# Patient Record
Sex: FEMALE | Race: BLACK OR AFRICAN AMERICAN | Employment: FULL TIME | URBAN - METROPOLITAN AREA
[De-identification: names, ages, dates, MRNs, and addresses within clinical notes are randomized per-mention and may not be internally consistent; named-entity substitution may affect disease eponyms.]

---

## 2017-09-02 ENCOUNTER — OCC HEALTH (OUTPATIENT)
Dept: OCCUPATIONAL MEDICINE | Age: 37
End: 2017-09-02
Attending: PHYSICIAN ASSISTANT

## 2021-09-07 ENCOUNTER — OFFICE VISIT (OUTPATIENT)
Dept: OBGYN CLINIC | Facility: CLINIC | Age: 41
End: 2021-09-07
Payer: COMMERCIAL

## 2021-09-07 VITALS
DIASTOLIC BLOOD PRESSURE: 76 MMHG | BODY MASS INDEX: 29.15 KG/M2 | HEART RATE: 103 BPM | SYSTOLIC BLOOD PRESSURE: 122 MMHG | WEIGHT: 196.81 LBS | HEIGHT: 69 IN

## 2021-09-07 DIAGNOSIS — Z12.4 CERVICAL CANCER SCREENING: ICD-10-CM

## 2021-09-07 DIAGNOSIS — Z13.29 SCREENING FOR ENDOCRINE, NUTRITIONAL, METABOLIC AND IMMUNITY DISORDER: ICD-10-CM

## 2021-09-07 DIAGNOSIS — Z86.2 HISTORY OF ANEMIA: ICD-10-CM

## 2021-09-07 DIAGNOSIS — Z13.21 SCREENING FOR ENDOCRINE, NUTRITIONAL, METABOLIC AND IMMUNITY DISORDER: ICD-10-CM

## 2021-09-07 DIAGNOSIS — Z12.31 ENCOUNTER FOR SCREENING MAMMOGRAM FOR BREAST CANCER: ICD-10-CM

## 2021-09-07 DIAGNOSIS — Z01.419 WELL WOMAN EXAM WITH ROUTINE GYNECOLOGICAL EXAM: Primary | ICD-10-CM

## 2021-09-07 DIAGNOSIS — Z13.228 SCREENING FOR ENDOCRINE, NUTRITIONAL, METABOLIC AND IMMUNITY DISORDER: ICD-10-CM

## 2021-09-07 DIAGNOSIS — R53.83 FATIGUE, UNSPECIFIED TYPE: ICD-10-CM

## 2021-09-07 DIAGNOSIS — R61 NIGHT SWEATS: ICD-10-CM

## 2021-09-07 DIAGNOSIS — Z13.0 SCREENING FOR ENDOCRINE, NUTRITIONAL, METABOLIC AND IMMUNITY DISORDER: ICD-10-CM

## 2021-09-07 PROCEDURE — 3074F SYST BP LT 130 MM HG: CPT | Performed by: NURSE PRACTITIONER

## 2021-09-07 PROCEDURE — 87624 HPV HI-RISK TYP POOLED RSLT: CPT | Performed by: NURSE PRACTITIONER

## 2021-09-07 PROCEDURE — 88175 CYTOPATH C/V AUTO FLUID REDO: CPT | Performed by: NURSE PRACTITIONER

## 2021-09-07 PROCEDURE — 99386 PREV VISIT NEW AGE 40-64: CPT | Performed by: NURSE PRACTITIONER

## 2021-09-07 PROCEDURE — 3008F BODY MASS INDEX DOCD: CPT | Performed by: NURSE PRACTITIONER

## 2021-09-07 PROCEDURE — 3078F DIAST BP <80 MM HG: CPT | Performed by: NURSE PRACTITIONER

## 2021-09-07 NOTE — PROGRESS NOTES
Here for new gynecology visit. 36year old G 1 P 1. Patient's last menstrual period was 08/25/2021 (exact date). .     Here for Annual Gynecologic Exam. She has a number of concerns to address today. She had a robotic removal of fibroids last year.  She ha swallowing. No hx thyroid dysfunction. Lungs:  No SOB, cough, wheezing, pneumonia in past.  Heart:  No chest pain, palpitations. Breasts:  No pain, lumps or secretions.   GI:   No nausea, emesis, reflux, liver disease, GB problems, issues with diarrhea or DIFFERENTIAL WITH PLATELET;  Future  - VITAMIN D, 25-HYDROXY; Future  - VITAMIN B12; Future    RTC 1 year/ prn

## 2021-09-13 LAB — HPV I/H RISK 1 DNA SPEC QL NAA+PROBE: NEGATIVE

## 2021-09-24 ENCOUNTER — LAB ENCOUNTER (OUTPATIENT)
Dept: LAB | Age: 41
End: 2021-09-24
Attending: NURSE PRACTITIONER
Payer: COMMERCIAL

## 2021-09-24 DIAGNOSIS — R61 NIGHT SWEATS: ICD-10-CM

## 2021-09-24 DIAGNOSIS — Z13.228 SCREENING FOR ENDOCRINE, NUTRITIONAL, METABOLIC AND IMMUNITY DISORDER: ICD-10-CM

## 2021-09-24 DIAGNOSIS — Z13.29 SCREENING FOR ENDOCRINE, NUTRITIONAL, METABOLIC AND IMMUNITY DISORDER: ICD-10-CM

## 2021-09-24 DIAGNOSIS — Z13.0 SCREENING FOR ENDOCRINE, NUTRITIONAL, METABOLIC AND IMMUNITY DISORDER: ICD-10-CM

## 2021-09-24 DIAGNOSIS — R53.83 FATIGUE, UNSPECIFIED TYPE: ICD-10-CM

## 2021-09-24 DIAGNOSIS — Z13.21 SCREENING FOR ENDOCRINE, NUTRITIONAL, METABOLIC AND IMMUNITY DISORDER: ICD-10-CM

## 2021-09-24 DIAGNOSIS — Z86.2 HISTORY OF ANEMIA: ICD-10-CM

## 2021-09-24 LAB
ALBUMIN SERPL-MCNC: 3.4 G/DL (ref 3.4–5)
ALBUMIN/GLOB SERPL: 1 {RATIO} (ref 1–2)
ALP LIVER SERPL-CCNC: 80 U/L
ALT SERPL-CCNC: 17 U/L
ANION GAP SERPL CALC-SCNC: 4 MMOL/L (ref 0–18)
AST SERPL-CCNC: 15 U/L (ref 15–37)
BASOPHILS # BLD AUTO: 0.01 X10(3) UL (ref 0–0.2)
BASOPHILS NFR BLD AUTO: 0.2 %
BILIRUB SERPL-MCNC: 0.4 MG/DL (ref 0.1–2)
BUN BLD-MCNC: 11 MG/DL (ref 7–18)
CALCIUM BLD-MCNC: 9 MG/DL (ref 8.5–10.1)
CHLORIDE SERPL-SCNC: 111 MMOL/L (ref 98–112)
CO2 SERPL-SCNC: 26 MMOL/L (ref 21–32)
CREAT BLD-MCNC: 1.04 MG/DL
EOSINOPHIL # BLD AUTO: 0.07 X10(3) UL (ref 0–0.7)
EOSINOPHIL NFR BLD AUTO: 1.5 %
ERYTHROCYTE [DISTWIDTH] IN BLOOD BY AUTOMATED COUNT: 13.2 %
ESTRADIOL SERPL-MCNC: 143.3 PG/ML
FSH SERPL-ACNC: 4.4 MIU/ML
GLOBULIN PLAS-MCNC: 3.5 G/DL (ref 2.8–4.4)
GLUCOSE BLD-MCNC: 107 MG/DL (ref 70–99)
HCT VFR BLD AUTO: 38.5 %
HGB BLD-MCNC: 11.8 G/DL
IMM GRANULOCYTES # BLD AUTO: 0.01 X10(3) UL (ref 0–1)
IMM GRANULOCYTES NFR BLD: 0.2 %
LH SERPL-ACNC: 2.8 MIU/ML
LYMPHOCYTES # BLD AUTO: 1.5 X10(3) UL (ref 1–4)
LYMPHOCYTES NFR BLD AUTO: 31.4 %
MCH RBC QN AUTO: 28.5 PG (ref 26–34)
MCHC RBC AUTO-ENTMCNC: 30.6 G/DL (ref 31–37)
MCV RBC AUTO: 93 FL
MONOCYTES # BLD AUTO: 0.38 X10(3) UL (ref 0.1–1)
MONOCYTES NFR BLD AUTO: 8 %
NEUTROPHILS # BLD AUTO: 2.8 X10 (3) UL (ref 1.5–7.7)
NEUTROPHILS # BLD AUTO: 2.8 X10(3) UL (ref 1.5–7.7)
NEUTROPHILS NFR BLD AUTO: 58.7 %
OSMOLALITY SERPL CALC.SUM OF ELEC: 292 MOSM/KG (ref 275–295)
PATIENT FASTING Y/N/NP: NO
PLATELET # BLD AUTO: 213 10(3)UL (ref 150–450)
POTASSIUM SERPL-SCNC: 4.4 MMOL/L (ref 3.5–5.1)
PROT SERPL-MCNC: 6.9 G/DL (ref 6.4–8.2)
RBC # BLD AUTO: 4.14 X10(6)UL
SODIUM SERPL-SCNC: 141 MMOL/L (ref 136–145)
T4 FREE SERPL-MCNC: 0.9 NG/DL (ref 0.8–1.7)
TSI SER-ACNC: 0.82 MIU/ML (ref 0.36–3.74)
VIT B12 SERPL-MCNC: 485 PG/ML (ref 193–986)
VIT D+METAB SERPL-MCNC: 28.6 NG/ML (ref 30–100)
WBC # BLD AUTO: 4.8 X10(3) UL (ref 4–11)

## 2021-09-24 PROCEDURE — 82306 VITAMIN D 25 HYDROXY: CPT

## 2021-09-24 PROCEDURE — 85025 COMPLETE CBC W/AUTO DIFF WBC: CPT

## 2021-09-24 PROCEDURE — 82670 ASSAY OF TOTAL ESTRADIOL: CPT

## 2021-09-24 PROCEDURE — 83001 ASSAY OF GONADOTROPIN (FSH): CPT

## 2021-09-24 PROCEDURE — 36415 COLL VENOUS BLD VENIPUNCTURE: CPT

## 2021-09-24 PROCEDURE — 82607 VITAMIN B-12: CPT

## 2021-09-24 PROCEDURE — 80053 COMPREHEN METABOLIC PANEL: CPT

## 2021-09-24 PROCEDURE — 84443 ASSAY THYROID STIM HORMONE: CPT

## 2021-09-24 PROCEDURE — 84439 ASSAY OF FREE THYROXINE: CPT

## 2021-09-24 PROCEDURE — 83002 ASSAY OF GONADOTROPIN (LH): CPT

## 2021-09-30 ENCOUNTER — ULTRASOUND ENCOUNTER (OUTPATIENT)
Dept: OBGYN CLINIC | Facility: CLINIC | Age: 41
End: 2021-09-30
Payer: COMMERCIAL

## 2021-09-30 DIAGNOSIS — D25.9 UTERINE LEIOMYOMA, UNSPECIFIED LOCATION: ICD-10-CM

## 2021-09-30 DIAGNOSIS — D50.8 OTHER IRON DEFICIENCY ANEMIA: Primary | ICD-10-CM

## 2021-09-30 PROCEDURE — 76830 TRANSVAGINAL US NON-OB: CPT | Performed by: OBSTETRICS & GYNECOLOGY

## 2021-09-30 PROCEDURE — 76856 US EXAM PELVIC COMPLETE: CPT | Performed by: OBSTETRICS & GYNECOLOGY

## 2021-10-26 ENCOUNTER — HOSPITAL ENCOUNTER (OUTPATIENT)
Dept: MAMMOGRAPHY | Age: 41
Discharge: HOME | End: 2021-10-26
Attending: NURSE PRACTITIONER
Payer: COMMERCIAL

## 2021-10-26 DIAGNOSIS — Z12.31 ENCOUNTER FOR SCREENING MAMMOGRAM FOR BREAST CANCER: ICD-10-CM

## 2021-10-26 PROCEDURE — 77063 BREAST TOMOSYNTHESIS BI: CPT | Performed by: NURSE PRACTITIONER

## 2021-10-26 PROCEDURE — 77067 SCR MAMMO BI INCL CAD: CPT | Performed by: NURSE PRACTITIONER

## 2021-11-01 ENCOUNTER — HOSPITAL ENCOUNTER (OUTPATIENT)
Dept: MRI IMAGING | Age: 41
Discharge: HOME | End: 2021-11-01
Attending: NURSE PRACTITIONER
Payer: COMMERCIAL

## 2021-11-01 DIAGNOSIS — N83.299 COMPLEX OVARIAN CYST: ICD-10-CM

## 2021-11-01 PROCEDURE — A9575 INJ GADOTERATE MEGLUMI 0.1ML: HCPCS | Performed by: NURSE PRACTITIONER

## 2021-11-01 PROCEDURE — 72197 MRI PELVIS W/O & W/DYE: CPT | Performed by: NURSE PRACTITIONER

## 2021-11-02 ENCOUNTER — TELEPHONE (OUTPATIENT)
Dept: OBGYN CLINIC | Facility: CLINIC | Age: 41
End: 2021-11-02

## 2021-11-02 NOTE — TELEPHONE ENCOUNTER
Patient informed of results and recommendations. Call transferred to [facility] to schedule.

## 2021-12-02 ENCOUNTER — OFFICE VISIT (OUTPATIENT)
Dept: OBGYN CLINIC | Facility: CLINIC | Age: 41
End: 2021-12-02
Payer: COMMERCIAL

## 2021-12-02 VITALS — SYSTOLIC BLOOD PRESSURE: 105 MMHG | BODY MASS INDEX: 29 KG/M2 | DIASTOLIC BLOOD PRESSURE: 72 MMHG | WEIGHT: 199 LBS

## 2021-12-02 DIAGNOSIS — D27.0 CYST, OVARY, DERMOID, RIGHT: Primary | ICD-10-CM

## 2021-12-02 PROCEDURE — 99213 OFFICE O/P EST LOW 20 MIN: CPT | Performed by: OBSTETRICS & GYNECOLOGY

## 2021-12-02 PROCEDURE — 3078F DIAST BP <80 MM HG: CPT | Performed by: OBSTETRICS & GYNECOLOGY

## 2021-12-02 PROCEDURE — 3074F SYST BP LT 130 MM HG: CPT | Performed by: OBSTETRICS & GYNECOLOGY

## 2021-12-02 NOTE — PROGRESS NOTES
Subjective:  39year old    Patient presents with:  Gyn Problem: Review MRI results    Patient had ultrasound for follow up fibroids and incidentally noted to have a right ovarian cyst.  Patient presents to review MRI results. No pain.   Normal mens of proteinaceous fluid measuring 13 x 20 by 20 mm.  This findings most consistent with a dermoid lesion.       The left ovary is normal in appearance with tiny physiologic follicles of the left ovary measuring 41 x 22 x 23 mm.    CUL-DE-SAC:  No fluid or ma with need for blood transfusion, scarring, injury to internal organs including bowel, bladder, ureters and major blood vessels with need for additional surgery, laparotomy and/or prolonged hospitalization.    Discussed risk of need for salpingo-oophorectomy

## 2021-12-08 ENCOUNTER — TELEPHONE (OUTPATIENT)
Dept: OBGYN CLINIC | Facility: CLINIC | Age: 41
End: 2021-12-08

## 2021-12-08 NOTE — TELEPHONE ENCOUNTER
----- Message from Jennifer De La Rosa MD sent at 12/2/2021  3:04 PM CST -----  Surgeon: Dr. Jennifer De La Rosa    Date:     Assistant: yes    Type of Admit/Expected Discharge Department: Outpatient/Same Day    LOS: 0    Procedure Location: Main OR    PreOp Dx: right ovarian dermoid     Procedure: operative laparoscopy, right ovarian cystectomy, possible right salpingo-oophorectomy, possible minilaparotomy     Anticipated Time:  1.5 hours    Anesthesia: General    Special Equipment/Comments: endocatch bag, ligasure/endoseal    Antibiotics: Initiate antibiotics per [facility] adult preoperative prophylactic antibiotic protocol     Pre Op Orders: initiate my Pre-op standing orders, if none exist please use [employer] Order     Labs: Per [facility]    Medical clearance: No

## 2021-12-22 ENCOUNTER — TELEPHONE (OUTPATIENT)
Dept: OBGYN CLINIC | Facility: CLINIC | Age: 41
End: 2021-12-22

## 2021-12-23 NOTE — TELEPHONE ENCOUNTER
Patient called asking questions about bloodwork that was done. She wanted to know if it was diabetic bloodwork.  Please advise
Patient notified that blood work done in September wasn't for a diabetes work-up. She was reminded to repeat her CMP and have her CA-125 done.   Patient verbalizes understanding
01-Jul-2018 06:19

## 2022-02-09 NOTE — TELEPHONE ENCOUNTER
Pt calling and wants to schedule surgery the week of July 4th    I advised she needs another OV and US  If you want to let me know when that should be I can schedule when ready

## 2022-03-24 ENCOUNTER — LAB ENCOUNTER (OUTPATIENT)
Dept: LAB | Age: 42
End: 2022-03-24
Attending: NURSE PRACTITIONER
Payer: COMMERCIAL

## 2022-03-24 DIAGNOSIS — Z13.0 SCREENING FOR ENDOCRINE, NUTRITIONAL, METABOLIC AND IMMUNITY DISORDER: ICD-10-CM

## 2022-03-24 DIAGNOSIS — Z13.29 SCREENING FOR ENDOCRINE, NUTRITIONAL, METABOLIC AND IMMUNITY DISORDER: ICD-10-CM

## 2022-03-24 DIAGNOSIS — Z13.21 SCREENING FOR ENDOCRINE, NUTRITIONAL, METABOLIC AND IMMUNITY DISORDER: ICD-10-CM

## 2022-03-24 DIAGNOSIS — N83.299 COMPLEX OVARIAN CYST: ICD-10-CM

## 2022-03-24 DIAGNOSIS — Z13.228 SCREENING FOR ENDOCRINE, NUTRITIONAL, METABOLIC AND IMMUNITY DISORDER: ICD-10-CM

## 2022-03-24 LAB
ALBUMIN SERPL-MCNC: 3.5 G/DL (ref 3.4–5)
ALBUMIN/GLOB SERPL: 1.2 {RATIO} (ref 1–2)
ALP LIVER SERPL-CCNC: 63 U/L
ALT SERPL-CCNC: 17 U/L
ANION GAP SERPL CALC-SCNC: 3 MMOL/L (ref 0–18)
AST SERPL-CCNC: 23 U/L (ref 15–37)
BILIRUB SERPL-MCNC: 0.6 MG/DL (ref 0.1–2)
BUN BLD-MCNC: 10 MG/DL (ref 7–18)
CALCIUM BLD-MCNC: 9 MG/DL (ref 8.5–10.1)
CANCER AG125 SERPL-ACNC: 12.9 U/ML (ref ?–35)
CHLORIDE SERPL-SCNC: 109 MMOL/L (ref 98–112)
CO2 SERPL-SCNC: 26 MMOL/L (ref 21–32)
CREAT BLD-MCNC: 1.21 MG/DL
FASTING STATUS PATIENT QL REPORTED: NO
GLOBULIN PLAS-MCNC: 3 G/DL (ref 2.8–4.4)
GLUCOSE BLD-MCNC: 94 MG/DL (ref 70–99)
OSMOLALITY SERPL CALC.SUM OF ELEC: 285 MOSM/KG (ref 275–295)
POTASSIUM SERPL-SCNC: 4.4 MMOL/L (ref 3.5–5.1)
PROT SERPL-MCNC: 6.5 G/DL (ref 6.4–8.2)
SODIUM SERPL-SCNC: 138 MMOL/L (ref 136–145)

## 2022-03-24 PROCEDURE — 86304 IMMUNOASSAY TUMOR CA 125: CPT

## 2022-03-24 PROCEDURE — 36415 COLL VENOUS BLD VENIPUNCTURE: CPT

## 2022-03-24 PROCEDURE — 80053 COMPREHEN METABOLIC PANEL: CPT

## 2022-05-24 ENCOUNTER — OFFICE VISIT (OUTPATIENT)
Dept: OBGYN CLINIC | Facility: CLINIC | Age: 42
End: 2022-05-24
Payer: COMMERCIAL

## 2022-05-24 ENCOUNTER — ULTRASOUND ENCOUNTER (OUTPATIENT)
Dept: OBGYN CLINIC | Facility: CLINIC | Age: 42
End: 2022-05-24
Payer: COMMERCIAL

## 2022-05-24 ENCOUNTER — TELEPHONE (OUTPATIENT)
Dept: OBGYN CLINIC | Facility: CLINIC | Age: 42
End: 2022-05-24

## 2022-05-24 VITALS
WEIGHT: 197.19 LBS | HEIGHT: 69 IN | BODY MASS INDEX: 29.2 KG/M2 | DIASTOLIC BLOOD PRESSURE: 72 MMHG | SYSTOLIC BLOOD PRESSURE: 108 MMHG

## 2022-05-24 DIAGNOSIS — D27.0 DERMOID CYST OF OVARY, RIGHT: Primary | ICD-10-CM

## 2022-05-24 PROCEDURE — 3074F SYST BP LT 130 MM HG: CPT | Performed by: OBSTETRICS & GYNECOLOGY

## 2022-05-24 PROCEDURE — 76830 TRANSVAGINAL US NON-OB: CPT | Performed by: OBSTETRICS & GYNECOLOGY

## 2022-05-24 PROCEDURE — 3008F BODY MASS INDEX DOCD: CPT | Performed by: OBSTETRICS & GYNECOLOGY

## 2022-05-24 PROCEDURE — 76856 US EXAM PELVIC COMPLETE: CPT | Performed by: OBSTETRICS & GYNECOLOGY

## 2022-05-24 PROCEDURE — 99213 OFFICE O/P EST LOW 20 MIN: CPT | Performed by: OBSTETRICS & GYNECOLOGY

## 2022-05-24 PROCEDURE — 3078F DIAST BP <80 MM HG: CPT | Performed by: OBSTETRICS & GYNECOLOGY

## 2022-05-24 RX ORDER — SWAB
SWAB, NON-MEDICATED MISCELLANEOUS
COMMUNITY

## 2022-05-24 NOTE — PROGRESS NOTES
Subjective:  Patient presents for follow up of right ovarian dermoid diagnosed by previous MRI and ultrasound last fall. Patient delayed surgery due to family care issues. No pain. Objective:  Physical Examination:  /72   Ht 69\"   Wt 197 lb 3.2 oz (89.4 kg)   LMP 05/21/2022 (Exact Date)   General appearance: Well dressed, well nourished in no apparent distress  Neurologic/Psychiatric: Alert and oriented to person, place and time, mood normal, affect appropriate    Summary of Ultrasound Findings:  Indications: Follow up right ovarian dermoid cyst, comparison to ultrasound 9/30/21  Scan first performed transabdominally, with no evidence of masses. Vaginal approach needed for further definition of pelvic anatomy. Transvaginal scan reveals a normal sized, retroverted uterus measuring 8.3 x 5.1 x 5.3 cm. Normal endometrial lining measuring 11 mm. No intracavitary lesions or growths. Three fibroids are visualized with measurements as follows:  1. Subserosal fundal 3 cm average diameter  2. Posterior intramural 1 cm  3. Anterior subserosal 1.4 cm  No free fluid. Normal left ovary. Right ovary contains a complex cyst measuring 4.2 cm average diameter, suspicious in appearance for dermoid cyst, and an adjacent complex cyst measuring 1.7 cm. Measurements unchanged from previous ultrasound. No increased color flow to either cyst.    Assessment/Plan:  Asymptomatic right ovarian cyst, likely dermoid- discussed options including expectant management, but based on size, recommend operative laparoscopy with right ovarian cystectomy. Risks, benefits and potential complications of the procedure reviewed, including risks of anesthesia, infection with need for intravenous antibiotics, bleeding with need for blood transfusion, scarring, injury to internal organs including bowel, bladder, ureters and major blood vessels with need for additional surgery, laparotomy and/or prolonged hospitalization.    Discussed risk of need for salpingo-oophorectomy. Discussed risk of scar tissue from previous myomectomy with need for lysis of adhesions or laparotomy and increased risk bowel complications. check operative report from previous surgery. Offered bilateral salpingectomy for sterilization and declined, but OK if tube needs to come out with oophorectomy. Undecided regarding future pregnancy. Currently not sexually active. All questions answered and patient agrees to the proposed procedure without reservation. Diagnoses and all orders for this visit:    Dermoid cyst of ovary, right  -     US TRANSVAG/ABDOMINAL [facility] ONLY; Future      Return for Post Operative Visit.

## 2022-05-24 NOTE — TELEPHONE ENCOUNTER
Faxed a  release of medical records to Dr Leticia Hammond.   Copy in [id number] folder

## 2022-06-02 ENCOUNTER — TELEPHONE (OUTPATIENT)
Dept: OBGYN CLINIC | Facility: CLINIC | Age: 42
End: 2022-06-02

## 2022-06-02 DIAGNOSIS — D36.9 DERMOID: Primary | ICD-10-CM

## 2022-06-02 DIAGNOSIS — D27.0 DERMOID CYST OF OVARY, RIGHT: ICD-10-CM

## 2022-06-02 NOTE — TELEPHONE ENCOUNTER
----- Message from Yuliana Anne MD sent at 5/24/2022  3:00 PM CDT -----  Surgeon: Dr. Yuliana Anne    Date:     Assistant: Need MD assist    Type of Admit/Expected Discharge Department: Outpatient/Same Day    LOS: 0    Procedure Location: Main OR    PreOp Dx: Right ovarian dermoid. Procedure:  Operative laparoscopy, right ovarian cystectomy, possible right salpingo-oophorectomy, possible lysis of adhesions, possible minilaparotomy. Anticipated Time:  1.5 hours.     Anesthesia: General    Special Equipment/Comments:     Antibiotics: Initiate antibiotics per [facility] adult preoperative prophylactic antibiotic protocol     Pre Op Orders: initiate my Pre-op standing orders, if none exist please use [employer] Order     Labs: Per [employer] clearance: No

## 2022-06-02 NOTE — TELEPHONE ENCOUNTER
Surgery scheduled for 8/1/22 at 36  Post op   Future Appointments   Date Time Provider Frank Hernández   8/15/2022  1:30 PM Marilou Alpers, MD [facility] [address]     Orders entered  Added to calendar  PA - pending   REF# [id number]

## 2022-07-27 ENCOUNTER — OFFICE VISIT (OUTPATIENT)
Dept: NEUROLOGY | Facility: CLINIC | Age: 42
End: 2022-07-27
Payer: COMMERCIAL

## 2022-07-27 VITALS
DIASTOLIC BLOOD PRESSURE: 74 MMHG | RESPIRATION RATE: 16 BRPM | HEART RATE: 78 BPM | WEIGHT: 190 LBS | BODY MASS INDEX: 28 KG/M2 | SYSTOLIC BLOOD PRESSURE: 110 MMHG

## 2022-07-27 DIAGNOSIS — R41.3 SHORT-TERM MEMORY LOSS: Primary | ICD-10-CM

## 2022-07-27 DIAGNOSIS — Z72.820 POOR SLEEP: ICD-10-CM

## 2022-07-27 DIAGNOSIS — E53.8 B12 DEFICIENCY: ICD-10-CM

## 2022-07-27 DIAGNOSIS — G47.00 INSOMNIA, UNSPECIFIED TYPE: ICD-10-CM

## 2022-07-27 PROCEDURE — 3078F DIAST BP <80 MM HG: CPT | Performed by: OTHER

## 2022-07-27 PROCEDURE — 99204 OFFICE O/P NEW MOD 45 MIN: CPT | Performed by: OTHER

## 2022-07-27 PROCEDURE — 3074F SYST BP LT 130 MM HG: CPT | Performed by: OTHER

## 2022-07-27 RX ORDER — ERGOCALCIFEROL 1.25 MG/1
CAPSULE ORAL WEEKLY
COMMUNITY
Start: 2022-06-06

## 2022-07-27 NOTE — PROGRESS NOTES
Patient here for evaluation of memory loss. Started 14 years ago, progressively worse over the last 3-4 years. Has trouble processing information, recall, misplacing items, lost in familiar places sometimes.

## 2022-09-01 ENCOUNTER — TELEPHONE (OUTPATIENT)
Dept: OBGYN CLINIC | Facility: CLINIC | Age: 42
End: 2022-09-01

## 2022-09-01 NOTE — TELEPHONE ENCOUNTER
Received Operative report from Dr Salome Pedersen.   Placed in Dr Efren welch in [address] [place] [address] for review

## (undated) NOTE — MR AVS SNAPSHOT
After Visit Summary   9/7/2021    Gagan Gomes    MRN: [id number]           Visit Information     Date & Time  9/7/2021  2:30 PM Provider  ABIEL Das Department  [facility], [address] [address] [place], [place].  Phone  [phone] 9/7/2021 (Approximate) 9/7/2022    THINPREP PAP WITH HPV REFLEX REQUEST B [[id number] CUSTOM]  9/7/2021 9/7/2022    VITAMIN B12 [0760045 CUSTOM]  9/7/2021 (Approximate) 9/7/2022    VITAMIN D, 25-HYDROXY [[id number] CUSTOM]  9/7/2021 (Approximate) 9/7/2022      F face-to-face with a [facility] physician or   [facility] using your mobile device or computer   using [employer].    e-VISITS  Communicate with a [facility] Physician or [facility] online. The physician will respond and provide   a treatment plan within a few hours.  ONLINE VISIT  Irene